# Patient Record
Sex: MALE | Race: OTHER | HISPANIC OR LATINO
[De-identification: names, ages, dates, MRNs, and addresses within clinical notes are randomized per-mention and may not be internally consistent; named-entity substitution may affect disease eponyms.]

---

## 2020-07-21 PROBLEM — Z00.00 ENCOUNTER FOR PREVENTIVE HEALTH EXAMINATION: Status: ACTIVE | Noted: 2020-07-21

## 2020-07-23 ENCOUNTER — NON-APPOINTMENT (OUTPATIENT)
Age: 60
End: 2020-07-23

## 2020-07-23 ENCOUNTER — APPOINTMENT (OUTPATIENT)
Dept: HEART AND VASCULAR | Facility: CLINIC | Age: 60
End: 2020-07-23
Payer: COMMERCIAL

## 2020-07-23 VITALS
OXYGEN SATURATION: 98 % | HEIGHT: 68 IN | HEART RATE: 67 BPM | DIASTOLIC BLOOD PRESSURE: 74 MMHG | WEIGHT: 182 LBS | SYSTOLIC BLOOD PRESSURE: 130 MMHG | BODY MASS INDEX: 27.58 KG/M2

## 2020-07-23 VITALS — TEMPERATURE: 98.2 F

## 2020-07-23 DIAGNOSIS — I10 ESSENTIAL (PRIMARY) HYPERTENSION: ICD-10-CM

## 2020-07-23 PROCEDURE — 93306 TTE W/DOPPLER COMPLETE: CPT

## 2020-07-23 PROCEDURE — 93000 ELECTROCARDIOGRAM COMPLETE: CPT

## 2020-07-23 PROCEDURE — 36415 COLL VENOUS BLD VENIPUNCTURE: CPT

## 2020-07-23 PROCEDURE — 81005 URINALYSIS: CPT

## 2020-07-23 PROCEDURE — 99205 OFFICE O/P NEW HI 60 MIN: CPT | Mod: 25

## 2020-07-23 RX ORDER — ASPIRIN 81 MG/1
81 TABLET ORAL DAILY
Qty: 30 | Refills: 6 | Status: ACTIVE | COMMUNITY
Start: 2020-07-23 | End: 1900-01-01

## 2020-07-23 NOTE — PHYSICAL EXAM
[General Appearance - Well Developed] : well developed [General Appearance - In No Acute Distress] : no acute distress [General Appearance - Well Nourished] : well nourished [Normal Conjunctiva] : the conjunctiva exhibited no abnormalities [Normal Jugular Venous V Waves Present] : normal jugular venous V waves present [Normal Oropharynx] : normal oropharynx [Heart Rate And Rhythm] : heart rate and rhythm were normal [Heart Sounds] : normal S1 and S2 [Murmurs] : no murmurs present [Edema] : no peripheral edema present [Arterial Pulses Normal] : the arterial pulses were normal [Respiration, Rhythm And Depth] : normal respiratory rhythm and effort [Auscultation Breath Sounds / Voice Sounds] : lungs were clear to auscultation bilaterally [Bowel Sounds] : normal bowel sounds [Abdomen Tenderness] : non-tender [Abdomen Soft] : soft [Abnormal Walk] : normal gait [Nail Clubbing] : no clubbing of the fingernails [Cyanosis, Localized] : no localized cyanosis [] : no rash [Skin Lesions] : no skin lesions [No Anxiety] : not feeling anxious [Oriented To Time, Place, And Person] : oriented to person, place, and time

## 2020-07-27 ENCOUNTER — APPOINTMENT (OUTPATIENT)
Dept: HEART AND VASCULAR | Facility: CLINIC | Age: 60
End: 2020-07-27
Payer: COMMERCIAL

## 2020-07-27 VITALS — TEMPERATURE: 98.7 F

## 2020-07-27 LAB
ALBUMIN SERPL ELPH-MCNC: 4.8 G/DL
ALP BLD-CCNC: 81 U/L
ALT SERPL-CCNC: 23 U/L
ANION GAP SERPL CALC-SCNC: 14 MMOL/L
APPEARANCE: CLEAR
AST SERPL-CCNC: 19 U/L
BACTERIA: NEGATIVE
BASOPHILS # BLD AUTO: 0.05 K/UL
BASOPHILS NFR BLD AUTO: 0.4 %
BILIRUB SERPL-MCNC: 0.7 MG/DL
BILIRUBIN URINE: NEGATIVE
BLOOD URINE: NEGATIVE
BUN SERPL-MCNC: 18 MG/DL
CALCIUM SERPL-MCNC: 9.8 MG/DL
CHLORIDE SERPL-SCNC: 102 MMOL/L
CHOLEST SERPL-MCNC: 194 MG/DL
CHOLEST/HDLC SERPL: 4.1 RATIO
CO2 SERPL-SCNC: 25 MMOL/L
COLOR: YELLOW
CREAT SERPL-MCNC: 0.79 MG/DL
EOSINOPHIL # BLD AUTO: 0.3 K/UL
EOSINOPHIL NFR BLD AUTO: 2.7 %
ESTIMATED AVERAGE GLUCOSE: 108 MG/DL
GLUCOSE QUALITATIVE U: NEGATIVE
GLUCOSE SERPL-MCNC: 81 MG/DL
HBA1C MFR BLD HPLC: 5.4 %
HCT VFR BLD CALC: 49.2 %
HDLC SERPL-MCNC: 47 MG/DL
HGB BLD-MCNC: 15.6 G/DL
HYALINE CASTS: 2 /LPF
IMM GRANULOCYTES NFR BLD AUTO: 0.6 %
KETONES URINE: NORMAL
LDLC SERPL CALC-MCNC: 116 MG/DL
LEUKOCYTE ESTERASE URINE: NEGATIVE
LYMPHOCYTES # BLD AUTO: 1.91 K/UL
LYMPHOCYTES NFR BLD AUTO: 16.9 %
MAGNESIUM SERPL-MCNC: 2.2 MG/DL
MAN DIFF?: NORMAL
MCHC RBC-ENTMCNC: 30.8 PG
MCHC RBC-ENTMCNC: 31.7 GM/DL
MCV RBC AUTO: 97 FL
MICROSCOPIC-UA: NORMAL
MONOCYTES # BLD AUTO: 0.96 K/UL
MONOCYTES NFR BLD AUTO: 8.5 %
NEUTROPHILS # BLD AUTO: 7.99 K/UL
NEUTROPHILS NFR BLD AUTO: 70.9 %
NITRITE URINE: NEGATIVE
PH URINE: 5.5
PLATELET # BLD AUTO: 212 K/UL
POTASSIUM SERPL-SCNC: 4.8 MMOL/L
PROT SERPL-MCNC: 7.4 G/DL
PROTEIN URINE: NORMAL
RBC # BLD: 5.07 M/UL
RBC # FLD: 13.6 %
RED BLOOD CELLS URINE: 1 /HPF
SARS-COV-2 IGG SERPL IA-ACNC: 0.08 INDEX
SARS-COV-2 IGG SERPL QL IA: NEGATIVE
SODIUM SERPL-SCNC: 141 MMOL/L
SPECIFIC GRAVITY URINE: 1.04
SQUAMOUS EPITHELIAL CELLS: 0 /HPF
TRIGL SERPL-MCNC: 152 MG/DL
TSH SERPL-ACNC: 0.97 UIU/ML
UROBILINOGEN URINE: ABNORMAL
WBC # FLD AUTO: 11.28 K/UL
WHITE BLOOD CELLS URINE: 1 /HPF

## 2020-07-27 PROCEDURE — 93306 TTE W/DOPPLER COMPLETE: CPT

## 2020-08-03 NOTE — DISCUSSION/SUMMARY
[Patient] : the patient [___ Week(s)] : [unfilled] week(s) [FreeTextEntry1] : \par 58 y/o male with h/o hl, smoker, overweight who presents for initial evaluation of chest pain\par \par -ordered ekg today - nsr, normal intervals, no st/t changes\par -ordered labs today\par -ordered CTA vs Nuc and Echo\par -pulm referral given smoking history and sob\par -smoking cessation referral and discussion\par -start asa 81 mg qd\par -consider statin pending labs\par -counseled on cvd risk factors\par -f/up 3 weeks

## 2020-08-03 NOTE — HISTORY OF PRESENT ILLNESS
[FreeTextEntry1] : \par 60 y/o male with h/o HL, smoker who presents for initial evaluation of chest pain and abnormal ekg\par \par \par \par Notes 2 weeks of cp, nonradiating, 7/10, lasts seconds-minutes, pressure, no associated symptoms\par \par no cough, fever\par Notes some sob over past 2 years with exertion\par no orthopnea, pnd \par no edema\par no syncope, lh, palpitations\par \par noted to have snoring by wife\par actively smoking, h/o 30 year use\par Not actively exercising\par walks a lot with work\par \par PMH/PSH:\par HL\par leg surgery right \par overweight\par \par \par \par ALL:\par nkda\par \par \par \par MEDS:\par advil prn\par \par \par \par SH:\par 10 cigs/day now - prior 1ppd x 30 years\par no etoh/drugs\[place] \par works construction\par live with fiance\par no children\par \par \par \par FH:\par mother - dm,  84\par father - cva,  89\par 9 sisters - alive, healthy\par

## 2020-08-14 ENCOUNTER — RESULT REVIEW (OUTPATIENT)
Age: 60
End: 2020-08-14

## 2020-08-14 ENCOUNTER — OUTPATIENT (OUTPATIENT)
Dept: OUTPATIENT SERVICES | Facility: HOSPITAL | Age: 60
LOS: 1 days | End: 2020-08-14
Payer: COMMERCIAL

## 2020-08-14 ENCOUNTER — APPOINTMENT (OUTPATIENT)
Dept: CT IMAGING | Facility: HOSPITAL | Age: 60
End: 2020-08-14
Payer: COMMERCIAL

## 2020-08-14 PROCEDURE — 71250 CT THORAX DX C-: CPT | Mod: 26,59

## 2020-08-14 PROCEDURE — 75574 CT ANGIO HRT W/3D IMAGE: CPT | Mod: 26

## 2020-08-14 PROCEDURE — 71250 CT THORAX DX C-: CPT

## 2020-08-14 PROCEDURE — 75574 CT ANGIO HRT W/3D IMAGE: CPT

## 2020-08-17 DIAGNOSIS — I25.10 ATHEROSCLEROTIC HEART DISEASE OF NATIVE CORONARY ARTERY W/OUT ANGINA PECTORIS: ICD-10-CM

## 2020-08-24 DIAGNOSIS — Z11.59 ENCOUNTER FOR SCREENING FOR OTHER VIRAL DISEASES: ICD-10-CM

## 2020-08-25 ENCOUNTER — APPOINTMENT (OUTPATIENT)
Dept: PULMONOLOGY | Facility: CLINIC | Age: 60
End: 2020-08-25

## 2020-09-01 ENCOUNTER — APPOINTMENT (OUTPATIENT)
Dept: PULMONOLOGY | Facility: CLINIC | Age: 60
End: 2020-09-01
Payer: COMMERCIAL

## 2020-09-01 VITALS
DIASTOLIC BLOOD PRESSURE: 82 MMHG | WEIGHT: 182 LBS | TEMPERATURE: 97.8 F | OXYGEN SATURATION: 97 % | HEART RATE: 67 BPM | SYSTOLIC BLOOD PRESSURE: 114 MMHG | HEIGHT: 68 IN | BODY MASS INDEX: 27.58 KG/M2

## 2020-09-01 DIAGNOSIS — F17.200 NICOTINE DEPENDENCE, UNSPECIFIED, UNCOMPLICATED: ICD-10-CM

## 2020-09-01 DIAGNOSIS — Z80.0 FAMILY HISTORY OF MALIGNANT NEOPLASM OF DIGESTIVE ORGANS: ICD-10-CM

## 2020-09-01 DIAGNOSIS — R07.9 CHEST PAIN, UNSPECIFIED: ICD-10-CM

## 2020-09-01 DIAGNOSIS — Z86.39 PERSONAL HISTORY OF OTHER ENDOCRINE, NUTRITIONAL AND METABOLIC DISEASE: ICD-10-CM

## 2020-09-01 DIAGNOSIS — Z78.9 OTHER SPECIFIED HEALTH STATUS: ICD-10-CM

## 2020-09-01 PROCEDURE — 36415 COLL VENOUS BLD VENIPUNCTURE: CPT

## 2020-09-01 PROCEDURE — 99204 OFFICE O/P NEW MOD 45 MIN: CPT | Mod: 25

## 2020-09-01 RX ORDER — FLUTICASONE FUROATE AND VILANTEROL TRIFENATATE 100; 25 UG/1; UG/1
100-25 POWDER RESPIRATORY (INHALATION)
Refills: 0 | Status: ACTIVE | COMMUNITY

## 2020-09-02 LAB
ALBUMIN SERPL ELPH-MCNC: 4.8 G/DL
ALP BLD-CCNC: 76 U/L
ALT SERPL-CCNC: 29 U/L
ANION GAP SERPL CALC-SCNC: 13 MMOL/L
AST SERPL-CCNC: 21 U/L
BASOPHILS # BLD AUTO: 0.06 K/UL
BASOPHILS NFR BLD AUTO: 0.5 %
BILIRUB SERPL-MCNC: 0.9 MG/DL
BUN SERPL-MCNC: 17 MG/DL
CALCIUM SERPL-MCNC: 9.7 MG/DL
CHLORIDE SERPL-SCNC: 101 MMOL/L
CO2 SERPL-SCNC: 24 MMOL/L
CREAT SERPL-MCNC: 0.75 MG/DL
CRP SERPL-MCNC: 0.16 MG/DL
EOSINOPHIL # BLD AUTO: 0.38 K/UL
EOSINOPHIL NFR BLD AUTO: 2.9 %
GLUCOSE SERPL-MCNC: 80 MG/DL
HCT VFR BLD CALC: 45.8 %
HGB BLD-MCNC: 15.3 G/DL
IMM GRANULOCYTES NFR BLD AUTO: 0.5 %
LYMPHOCYTES # BLD AUTO: 2.43 K/UL
LYMPHOCYTES NFR BLD AUTO: 18.5 %
MAN DIFF?: NORMAL
MCHC RBC-ENTMCNC: 31.2 PG
MCHC RBC-ENTMCNC: 33.4 GM/DL
MCV RBC AUTO: 93.5 FL
MONOCYTES # BLD AUTO: 1.02 K/UL
MONOCYTES NFR BLD AUTO: 7.8 %
NEUTROPHILS # BLD AUTO: 9.21 K/UL
NEUTROPHILS NFR BLD AUTO: 69.8 %
PLATELET # BLD AUTO: 200 K/UL
POTASSIUM SERPL-SCNC: 4.4 MMOL/L
PROT SERPL-MCNC: 7 G/DL
RBC # BLD: 4.9 M/UL
RBC # FLD: 12.9 %
SODIUM SERPL-SCNC: 138 MMOL/L
WBC # FLD AUTO: 13.16 K/UL

## 2020-09-03 LAB — ACE BLD-CCNC: 31 U/L

## 2020-09-03 NOTE — REASON FOR VISIT
[Initial] : an initial visit [Shortness of Breath] : shortness of breath [Pulmonary Nodules] : pulmonary nodules [TextBox_44] : sarcoidosis, dust exposure

## 2020-09-03 NOTE — HISTORY OF PRESENT ILLNESS
[Current] : current [TextBox_4] : 59 year old male current smoker 30 pack year history with PMHx of HLD & CAD presents today for further evaluation of shortness of breath & lung nodules.\par \par Approximately 3 years ago, patient found to have enlarged lymph nodes & emphysema . Biopsy was done which he states was negative for malignancy. Stopped smoking for about 6 months using NRT. \par Has been having a few months of nonradiating chest pain, mostly on the left side, 8/10, lasts for a few seconds- no aggravating or relieving factors- no associated symptoms.  Following with Dr. Rajan- started patient on statin for hyperlipidemia. Had echo recently which did not show significant cardiac abnormalities. \par Patient states his breathing is stable. Uses Breo Ellipta 1-2x a week when coughing and having chest tightness,  prescribed by outside pulmonologist. Intermittent dry cough, comes and goes. Wheezes usually at bedtime. 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 Working at least 8 hours per day. Has been doing this for many years. \par Has a dog at home; no other pets. \par No mold or leaks in home. \par No personal or family history of clots or lung disease. \par \par \par PFTs & 6MWT scheduled for 2 weeks [TextBox_13] : 30 [TextBox_11] : 1

## 2020-09-03 NOTE — DISCUSSION/SUMMARY
[FreeTextEntry1] : Attending's physical exam 9/1/20\par - Mild pallor, no icterus\par - Minimal arthritic changes visible; no visible rash; no cyanosis or clubbing\par - Calluses seen on palmar surfaces of both hands, especially right (over pressure points); no palmar erythema \par - No JVD at 45, no hepatojugular reflux, no hepatosplenomegaly\par - No pedal edema\par - Good air entry bilaterally; no wheezing, rhonchi or crackles\par - Normal S1, S2 no murmurs rubs or gallops, p2 not loud or split

## 2020-09-03 NOTE — PROCEDURE
[FreeTextEntry1] : CT chest 8/14/20 \par - Several scattered solid nodules measuring up to 5 mm, likely inflammatory. \par - Cyst in lateral basal left lower lobe\par - Airtrapping in both lungs \par - Minimal emphysema. Small and large airway disease. \par - Borderline dilated ascending aorta, 3.8 cm. \par \par Echo 10/27\par - PASP 24mmHg, LVEF 60%, Stage 1 diastolic dysfunction \par

## 2020-09-03 NOTE — PHYSICAL EXAM
[No Acute Distress] : no acute distress [Normal Oropharynx] : normal oropharynx [Normal Appearance] : normal appearance [No Neck Mass] : no neck mass [Normal Rate/Rhythm] : normal rate/rhythm [Normal S1, S2] : normal s1, s2 [No Murmurs] : no murmurs [No Resp Distress] : no resp distress [Clear to Auscultation Bilaterally] : clear to auscultation bilaterally [No Abnormalities] : no abnormalities [Benign] : benign [Normal Gait] : normal gait [No Clubbing] : no clubbing [No Cyanosis] : no cyanosis [No Edema] : no edema [FROM] : FROM [Normal Color/ Pigmentation] : normal color/ pigmentation [No Focal Deficits] : no focal deficits [Oriented x3] : oriented x3 [Normal Affect] : normal affect [IV] : Mallampati Class: IV [Neck Circumference: ___] : neck circumference: [unfilled] [TextBox_54] : Normal S1, S2 no murmurs rubs or gallops, P2 not loud or split  [TextBox_11] : Mild pallor, no icterus [TextBox_68] :  Good air entry bilaterally; no wheezing, rhonchi or crackles [TextBox_105] : Calluses seen on palmar surfaces of both hands, especially right (over pressure points); no palmar erythema

## 2020-09-03 NOTE — ASSESSMENT
[FreeTextEntry1] : 9-1-20\par It was a pleasure to meet Andrzej in consultation today. His respiratory issues are summarized below:\par \par (1) Abnormal CT scan\par I have reviewed his CT scan from 8/14/20 which shows bilateral hilar and mediastinal lymphadenopathy which is symmetric. There is evidence of thickening and dilatation of the bronchi suggestive of bronchiectasis. Airway lumen is irregular. There is generalized mosaic attenuation. There is a 1 cm cyst in the left lower lobe lateral basal segment. There is minimal centrilobular emphysema. A few scattered pulmonary nodules which measure up to 5 mm in size are seen. This is suggestive of sarcoidosis. It is difficult to tell whether the sarcoidosis is active at the present time. Almost 70% of cases of sarcoidosis becoming inactive within the first two years of diagnosis. Today, we will get ACE levels, CRP, liver function tests to check for disease activity. We have also requested patient and his wife to get bronchoscopy biopsy specimen from [facility] in [place].. We would like to get a second opinion on pathology. In the meantime, we will get full PFTs done, including FVC, DLCO and 6MWT. We will continue to follow the patient's symptoms, PFTs and CT chests periodically. \par \par (2) Mineral dust pneumoconiosis\par Patient is a  exposed to significant amount of dust and chemicals. In the last 6 months he has not had the appropriate protective mask and feels that his contact with chemicals and dust is worsening his respiratory condition. With a history such as Andrzej's, I am concerned for two additional things.\par A. Mineral dust pneumoconiosis\par B. Non-smoking related chronic obstructive pulmonary disease\par These conditions may compound the existing lung condition from sarcoidosis. We have therefore advised Andrzej to stop working in this kind of environment. It is imperative that he not work, especially without the appropriate protective equipment. Will provide him with a note.\par \par (3) Smoking Cessation\par He has been very strongly advised to stop smoking. He has 20-30 pack year smoking history and is an active smoker. Will refer for smoking cessation education.\par \par (4) Lung Cancer Screening\par Patient has approximate 30 pack year smoking history, between age of 55-77 and is active smoker. We will enroll in Lung Cancer Screening program. \par \par (5) At risk for sleep apnea\par Patient snores at night. Collar size >16.5. Will obtain home sleep study. \par \par (6) Health maintenance\par Stressed importance of daily exercise, including brisk walking at least 5-6 days per week. Patient will need flu vaccine for [id number] flu season & can get in Mid-September.\par \par Return to clinic\par - 1 month for telehealth \par \par

## 2020-09-03 NOTE — REVIEW OF SYSTEMS
[Negative] : Endocrine [Cough] : cough [Chest Tightness] : chest tightness [Wheezing] : wheezing [Chest Discomfort] : chest discomfort [Fever] : no fever [Fatigue] : no fatigue [Sputum] : no sputum [Chills] : no chills [Pleuritic Pain] : no pleuritic pain [Dyspnea] : no dyspnea [SOB on Exertion] : no sob on exertion

## 2020-09-14 ENCOUNTER — OUTPATIENT (OUTPATIENT)
Dept: OUTPATIENT SERVICES | Facility: HOSPITAL | Age: 60
LOS: 1 days | End: 2020-09-14
Payer: COMMERCIAL

## 2020-09-14 DIAGNOSIS — R06.02 SHORTNESS OF BREATH: ICD-10-CM

## 2020-09-14 PROCEDURE — 94726 PLETHYSMOGRAPHY LUNG VOLUMES: CPT | Mod: 26

## 2020-09-14 PROCEDURE — 94729 DIFFUSING CAPACITY: CPT

## 2020-09-14 PROCEDURE — 94618 PULMONARY STRESS TESTING: CPT | Mod: 26

## 2020-09-14 PROCEDURE — 94726 PLETHYSMOGRAPHY LUNG VOLUMES: CPT

## 2020-09-14 PROCEDURE — 94729 DIFFUSING CAPACITY: CPT | Mod: 26

## 2020-09-14 PROCEDURE — 94010 BREATHING CAPACITY TEST: CPT | Mod: 26

## 2020-09-14 PROCEDURE — 94760 N-INVAS EAR/PLS OXIMETRY 1: CPT

## 2020-09-14 PROCEDURE — 94618 PULMONARY STRESS TESTING: CPT

## 2020-09-14 PROCEDURE — 94060 EVALUATION OF WHEEZING: CPT

## 2020-10-13 ENCOUNTER — APPOINTMENT (OUTPATIENT)
Dept: PULMONOLOGY | Facility: CLINIC | Age: 60
End: 2020-10-13

## 2020-10-28 ENCOUNTER — NON-APPOINTMENT (OUTPATIENT)
Age: 60
End: 2020-10-28

## 2021-07-06 ENCOUNTER — RX RENEWAL (OUTPATIENT)
Age: 61
End: 2021-07-06

## 2021-09-09 PROBLEM — Z91.89 AT RISK FOR OBSTRUCTIVE SLEEP APNEA: Status: ACTIVE | Noted: 2020-09-01

## 2021-09-09 PROBLEM — J63.6: Status: ACTIVE | Noted: 2020-09-01

## 2021-09-14 ENCOUNTER — APPOINTMENT (OUTPATIENT)
Dept: PULMONOLOGY | Facility: CLINIC | Age: 61
End: 2021-09-14
Payer: COMMERCIAL

## 2021-09-14 VITALS
WEIGHT: 195 LBS | DIASTOLIC BLOOD PRESSURE: 86 MMHG | HEIGHT: 68 IN | HEART RATE: 66 BPM | SYSTOLIC BLOOD PRESSURE: 174 MMHG | TEMPERATURE: 97.4 F | BODY MASS INDEX: 29.55 KG/M2 | OXYGEN SATURATION: 96 %

## 2021-09-14 DIAGNOSIS — Z91.89 OTHER SPECIFIED PERSONAL RISK FACTORS, NOT ELSEWHERE CLASSIFIED: ICD-10-CM

## 2021-09-14 DIAGNOSIS — J63.6: ICD-10-CM

## 2021-09-14 PROCEDURE — 99215 OFFICE O/P EST HI 40 MIN: CPT

## 2021-09-14 NOTE — PHYSICAL EXAM
[No Acute Distress] : no acute distress [Normal Oropharynx] : normal oropharynx [IV] : Mallampati Class: IV [Neck Circumference: ___] : neck circumference: [unfilled] [No Neck Mass] : no neck mass [Normal Rate/Rhythm] : normal rate/rhythm [Normal S1, S2] : normal s1, s2 [No Murmurs] : no murmurs [No Resp Distress] : no resp distress [Clear to Auscultation Bilaterally] : clear to auscultation bilaterally [Benign] : benign [No Abnormalities] : no abnormalities [Normal Gait] : normal gait [No Clubbing] : no clubbing [No Cyanosis] : no cyanosis [No Edema] : no edema [FROM] : FROM [Normal Color/ Pigmentation] : normal color/ pigmentation [No Focal Deficits] : no focal deficits [Oriented x3] : oriented x3 [Normal Affect] : normal affect [TextBox_11] : no pallor, no icterus [TextBox_44] :  erythema noted at the base of the neck [TextBox_54] : Normal S1, S2 no murmurs rubs or gallops, P2 not loud or split  [TextBox_68] :  Good air entry bilaterally; no wheezing, rhonchi or crackles [TextBox_105] : bony prominence noted on the middle finger of the right hand, no thickening of the skin, radial pulses equal and palpable

## 2021-09-14 NOTE — REASON FOR VISIT
[Shortness of Breath] : shortness of breath [Pulmonary Nodules] : pulmonary nodules [Follow-Up] : a follow-up visit [TextBox_44] : sarcoidosis, dust exposure

## 2021-09-14 NOTE — REVIEW OF SYSTEMS
[Cough] : cough [Chest Tightness] : chest tightness [Wheezing] : wheezing [Chest Discomfort] : chest discomfort [Negative] : Endocrine [Dyspnea] : dyspnea [SOB on Exertion] : sob on exertion [Fever] : no fever [Fatigue] : no fatigue [Chills] : no chills [Sputum] : no sputum [Pleuritic Pain] : no pleuritic pain

## 2021-09-14 NOTE — HISTORY OF PRESENT ILLNESS
[Former] : former [TextBox_4] : 59 year old male current smoker 30 pack year history with PMHx of HLD & CAD presents today for further evaluation of shortness of breath & lung nodules.\par \par Approximately 3 years ago, patient found to have enlarged lymph nodes & emphysema . Biopsy was done which he states was negative for malignancy. Stopped smoking for about 6 months using NRT. \par Has been having a few months of nonradiating chest pain, mostly on the left side, 8/10, lasts for a few seconds- no aggravating or relieving factors- no associated symptoms.  Following with Dr. Rajan- started patient on statin for hyperlipidemia. Had echo recently which did not show significant cardiac abnormalities. \par Patient states his breathing is stable. Uses Breo Ellipta 1-2x a week when coughing and having chest tightness,  prescribed by outside pulmonologist. Intermittent dry cough, comes and goes. Wheezes usually at bedtime. 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 Working at least 8 hours per day. Has been doing this for many years. \par Has a dog at home; no other pets. \par No mold or leaks in home. \par No personal or family history of clots or lung disease. \par \par \par 9-14-21:\par He has been getting tired. \par He is having chest pain on the left\par He has shortness of breath after walking up 1 flight of stairs\par He has quit smoking, stopped Sept 2020\par He continues on Breo\par Still working in construction [TextBox_13] : 30 [TextBox_11] : 1 [YearQuit] : 2020

## 2021-09-14 NOTE — DISCUSSION/SUMMARY
[FreeTextEntry1] : Attending's physical exam \par 9-14-21:\par - no pallor, no icterus\par - erythema noted at the base of the neck\par - Minimal arthritic changes visible; no visible rash; no cyanosis or clubbing\par - bony prominence noted on the middle finger of the right hand, no thickening of the skin, radial pulses equal and palpable\par - No JVD at 45, no hepatojugular reflux, no hepatosplenomegaly\par - No pedal edema\par - Good air entry bilaterally; no wheezing, rhonchi or crackles\par - Normal S1, S2 no murmurs rubs or gallops, p2 not loud or split

## 2021-09-14 NOTE — ASSESSMENT
[FreeTextEntry1] : 9-14-21:\par It was a pleasure to see Andrzej in follow up today. His respiratory issues are summarized below:\par \par (1) Abnormal CT scan\par I have reviewed his CT scan from 8/14/20 which shows bilateral hilar and mediastinal lymphadenopathy which is symmetric. There is evidence of thickening and dilatation of the bronchi suggestive of bronchiectasis. There is generalized mosaic attenuation. There is a 1 cm cyst in the left lower lobe lateral basal segment. There is minimal centrilobular emphysema. There are hazy ill defined ground glass nodules. Most the CT lung findings are consistent with smoking related lung disease, respiratory bronchiolitis. These findings may also be suggestive of sarcoidosis. Almost 70% of cases of sarcoidosis becoming inactive within the first two years of diagnosis. ACE level, CRP, and liver function tests to check for disease activity were all normal. We have not been able to obtain his biopsy specimen from [facility] to get a second opinion.\par \par PFT done 9/14/20 shows normal spirometry, normal lung volumes and mildly reduced diffusion capacity (DLCO: 21.1 (75%)\par 6MWT 9/14/20: Distance: 512 meters, SpO2 start: 100% on RA -> SpO2 end: 100% on RA\par \par Plan:\par - We will repeat PFT and 6MWT now\par - We will get a low dose Chest CT to re-evaluate with prone positioning\par - We have again requested patient and his wife to get bronchoscopy biopsy specimen from [facility] in [place]. We would like to get a second opinion on pathology. \par \par (2) Mineral dust pneumoconiosis\par Patient is a  exposed to significant amount of dust and chemicals. He has not had the appropriate protective mask and feels that his contact with chemicals and dust is worsening his respiratory condition. With a history such as Andrzej's, I am concerned for two additional things.\par A. Mineral dust pneumoconiosis\par B. Non-smoking related chronic obstructive pulmonary disease\par These conditions may compound the existing lung condition from sarcoidosis. We have therefore advised Andrzej to stop working in this kind of environment. It is imperative that he not work, especially without the appropriate protective equipment. \par \par (3) Smoking Cessation\par He has stopped smoking. He has a 20-30 pack year smoking history. CT shows evidence of emphysema. Continue Breo Ellipta.\par \par (4) Lung Cancer Screening\par Patient has approximate 30 pack year smoking history, between age of 55-77 and quit smoking in 2020. He is due for annual chest CT. . We will hold off referring him to lung cancer screening program until we get a follow up chest CT with prone positioning (see above).\par \par (5) At risk for sleep apnea\par Patient snores at night. Collar size >16.5. We ordered a home sleep study, but this hasn't been done. We have again recommended this today.\par \par (6) Health maintenance\par Stressed importance of daily exercise, including brisk walking at least 5-6 days per week. \par \par Return to clinic: 6 months

## 2021-09-14 NOTE — PROCEDURE
[FreeTextEntry1] : Spirometry, DLCO 20\par FVC: 4.16 L (96%)--> 3.99 L (92%) \par FEV1: 2.9 L (86%)--> 2.8 L (83%) \par FEV1/FVC: 90%--> 90%\par AFX54-82%: 1.84 L/s (59%)--> 1.79 L/s (57%)\par T.98 L (98%)\par RV/T%\par DLCO: 21.1 (75%)\par \par 6MWT 20\par Distance: 512 meters\par SpO2 start: 100% on RA\par SpO2 end: 100% on RA\par \par CT chest 20 \par - Several scattered solid nodules measuring up to 5 mm, likely inflammatory. \par - Cyst in lateral basal left lower lobe\par - Airtrapping in both lungs \par - Minimal emphysema. Small and large airway disease. \par - Borderline dilated ascending aorta, 3.8 cm. \par \par Echo 10/27\par - PASP 24mmHg, LVEF 60%, Stage 1 diastolic dysfunction \par

## 2021-09-27 ENCOUNTER — APPOINTMENT (OUTPATIENT)
Dept: CT IMAGING | Facility: CLINIC | Age: 61
End: 2021-09-27
Payer: COMMERCIAL

## 2021-09-27 ENCOUNTER — OUTPATIENT (OUTPATIENT)
Dept: OUTPATIENT SERVICES | Facility: HOSPITAL | Age: 61
LOS: 1 days | End: 2021-09-27

## 2021-09-27 ENCOUNTER — APPOINTMENT (OUTPATIENT)
Dept: RADIOLOGY | Facility: CLINIC | Age: 61
End: 2021-09-27
Payer: COMMERCIAL

## 2021-09-27 ENCOUNTER — RESULT REVIEW (OUTPATIENT)
Age: 61
End: 2021-09-27

## 2021-09-27 PROCEDURE — 73600 X-RAY EXAM OF ANKLE: CPT | Mod: 26,RT

## 2021-09-27 PROCEDURE — 71250 CT THORAX DX C-: CPT | Mod: 26

## 2021-09-27 PROCEDURE — 73630 X-RAY EXAM OF FOOT: CPT | Mod: 26,RT

## 2021-10-01 ENCOUNTER — NON-APPOINTMENT (OUTPATIENT)
Age: 61
End: 2021-10-01

## 2021-10-06 ENCOUNTER — APPOINTMENT (OUTPATIENT)
Dept: GASTROENTEROLOGY | Facility: CLINIC | Age: 61
End: 2021-10-06
Payer: COMMERCIAL

## 2021-10-06 DIAGNOSIS — K62.5 HEMORRHAGE OF ANUS AND RECTUM: ICD-10-CM

## 2021-10-06 PROCEDURE — 99214 OFFICE O/P EST MOD 30 MIN: CPT

## 2021-10-06 RX ORDER — SODIUM SULFATE, MAGNESIUM SULFATE, AND POTASSIUM CHLORIDE 17.75; 2.7; 2.25 G/1; G/1; G/1
1479-225-188 TABLET ORAL
Qty: 1 | Refills: 0 | Status: ACTIVE | COMMUNITY
Start: 2021-10-06 | End: 1900-01-01

## 2021-10-06 NOTE — PHYSICAL EXAM
[General Appearance - Alert] : alert [General Appearance - In No Acute Distress] : in no acute distress [Sclera] : the sclera and conjunctiva were normal [PERRL With Normal Accommodation] : pupils were equal in size, round, and reactive to light [Extraocular Movements] : extraocular movements were intact [Outer Ear] : the ears and nose were normal in appearance [Oropharynx] : the oropharynx was normal [Full Pulse] : the pedal pulses are present [Edema] : there was no peripheral edema [Bowel Sounds] : normal bowel sounds [Abdomen Soft] : soft [Abdomen Tenderness] : non-tender [Abdomen Mass (___ Cm)] : no abdominal mass palpated [Cervical Lymph Nodes Enlarged Posterior Bilaterally] : posterior cervical [Cervical Lymph Nodes Enlarged Anterior Bilaterally] : anterior cervical [Supraclavicular Lymph Nodes Enlarged Bilaterally] : supraclavicular [Axillary Lymph Nodes Enlarged Bilaterally] : axillary [Femoral Lymph Nodes Enlarged Bilaterally] : femoral [Inguinal Lymph Nodes Enlarged Bilaterally] : inguinal [No CVA Tenderness] : no ~M costovertebral angle tenderness [No Spinal Tenderness] : no spinal tenderness [Skin Color & Pigmentation] : normal skin color and pigmentation [Skin Turgor] : normal skin turgor [] : no rash [Deep Tendon Reflexes (DTR)] : deep tendon reflexes were 2+ and symmetric [Sensation] : the sensory exam was normal to light touch and pinprick [No Focal Deficits] : no focal deficits [Oriented To Time, Place, And Person] : oriented to person, place, and time [Impaired Insight] : insight and judgment were intact [Affect] : the affect was normal

## 2021-10-06 NOTE — ASSESSMENT
[FreeTextEntry1] : A low acid / reflux diet was discussed in great detail including  not smoking, not drinking alcohol, and not consuming foods that irritate the esophagus. It is helpful to eat small meals throughout the day instead of large meals. You should avoid eating before bedtime or lying down after you eat. It can be helpful to raise the head of your bed six inches. Additionally, you should maintain a healthy weight and good posture.. The patient was given written material to take home and review.\par \par pulm clearance

## 2021-10-07 ENCOUNTER — NON-APPOINTMENT (OUTPATIENT)
Age: 61
End: 2021-10-07

## 2021-10-13 ENCOUNTER — APPOINTMENT (OUTPATIENT)
Dept: PULMONOLOGY | Facility: CLINIC | Age: 61
End: 2021-10-13

## 2021-11-08 ENCOUNTER — APPOINTMENT (OUTPATIENT)
Dept: HEART AND VASCULAR | Facility: CLINIC | Age: 61
End: 2021-11-08
Payer: COMMERCIAL

## 2021-11-08 ENCOUNTER — NON-APPOINTMENT (OUTPATIENT)
Age: 61
End: 2021-11-08

## 2021-11-08 VITALS
BODY MASS INDEX: 28.64 KG/M2 | HEART RATE: 63 BPM | WEIGHT: 189 LBS | HEIGHT: 68 IN | DIASTOLIC BLOOD PRESSURE: 84 MMHG | OXYGEN SATURATION: 95 % | TEMPERATURE: 98.1 F | SYSTOLIC BLOOD PRESSURE: 125 MMHG

## 2021-11-08 DIAGNOSIS — Z01.818 ENCOUNTER FOR OTHER PREPROCEDURAL EXAMINATION: ICD-10-CM

## 2021-11-08 PROCEDURE — 36415 COLL VENOUS BLD VENIPUNCTURE: CPT

## 2021-11-08 PROCEDURE — 93000 ELECTROCARDIOGRAM COMPLETE: CPT

## 2021-11-08 PROCEDURE — 99215 OFFICE O/P EST HI 40 MIN: CPT | Mod: 25

## 2021-11-09 LAB
ALBUMIN SERPL ELPH-MCNC: 4.9 G/DL
ALP BLD-CCNC: 74 U/L
ALT SERPL-CCNC: 31 U/L
ANION GAP SERPL CALC-SCNC: 13 MMOL/L
APPEARANCE: CLEAR
APTT BLD: 30.8 SEC
AST SERPL-CCNC: 25 U/L
BACTERIA: NEGATIVE
BASOPHILS # BLD AUTO: 0.05 K/UL
BASOPHILS NFR BLD AUTO: 0.6 %
BILIRUB SERPL-MCNC: 1.3 MG/DL
BILIRUBIN URINE: NEGATIVE
BLOOD URINE: NEGATIVE
BUN SERPL-MCNC: 24 MG/DL
CALCIUM SERPL-MCNC: 9.7 MG/DL
CHLORIDE SERPL-SCNC: 104 MMOL/L
CHOLEST SERPL-MCNC: 138 MG/DL
CO2 SERPL-SCNC: 25 MMOL/L
COLOR: NORMAL
CREAT SERPL-MCNC: 0.84 MG/DL
CREAT SPEC-SCNC: 123 MG/DL
EOSINOPHIL # BLD AUTO: 0.25 K/UL
EOSINOPHIL NFR BLD AUTO: 2.9 %
ESTIMATED AVERAGE GLUCOSE: 108 MG/DL
GLUCOSE QUALITATIVE U: NEGATIVE
GLUCOSE SERPL-MCNC: 85 MG/DL
HBA1C MFR BLD HPLC: 5.4 %
HCT VFR BLD CALC: 44 %
HDLC SERPL-MCNC: 59 MG/DL
HGB BLD-MCNC: 14.8 G/DL
HYALINE CASTS: 0 /LPF
IMM GRANULOCYTES NFR BLD AUTO: 0.2 %
INR PPP: 1.04 RATIO
KETONES URINE: NEGATIVE
LDLC SERPL CALC-MCNC: 65 MG/DL
LEUKOCYTE ESTERASE URINE: NEGATIVE
LYMPHOCYTES # BLD AUTO: 2.39 K/UL
LYMPHOCYTES NFR BLD AUTO: 27.9 %
MAGNESIUM SERPL-MCNC: 1.9 MG/DL
MAN DIFF?: NORMAL
MCHC RBC-ENTMCNC: 31.1 PG
MCHC RBC-ENTMCNC: 33.6 GM/DL
MCV RBC AUTO: 92.4 FL
MICROALBUMIN 24H UR DL<=1MG/L-MCNC: <1.2 MG/DL
MICROALBUMIN/CREAT 24H UR-RTO: NORMAL MG/G
MICROSCOPIC-UA: NORMAL
MONOCYTES # BLD AUTO: 0.69 K/UL
MONOCYTES NFR BLD AUTO: 8.1 %
NEUTROPHILS # BLD AUTO: 5.16 K/UL
NEUTROPHILS NFR BLD AUTO: 60.3 %
NITRITE URINE: NEGATIVE
NONHDLC SERPL-MCNC: 79 MG/DL
PH URINE: 5.5
PLATELET # BLD AUTO: 224 K/UL
POTASSIUM SERPL-SCNC: 5 MMOL/L
PROT SERPL-MCNC: 7.5 G/DL
PROTEIN URINE: NEGATIVE
PT BLD: 12.2 SEC
RBC # BLD: 4.76 M/UL
RBC # FLD: 12.3 %
RED BLOOD CELLS URINE: 0 /HPF
SODIUM SERPL-SCNC: 141 MMOL/L
SPECIFIC GRAVITY URINE: 1.03
SQUAMOUS EPITHELIAL CELLS: 0 /HPF
TRIGL SERPL-MCNC: 69 MG/DL
TSH SERPL-ACNC: 2.02 UIU/ML
UROBILINOGEN URINE: NORMAL
WBC # FLD AUTO: 8.56 K/UL
WHITE BLOOD CELLS URINE: 0 /HPF

## 2021-11-09 NOTE — HISTORY OF PRESENT ILLNESS
[FreeTextEntry1] : \par \par 59 y/o male with h/o HL, former smoker, cad, possible sarcoidosis/mixed mineral dust pneumoconiosis , overweight who presents for f/up today and preprocedure for colonoscopy\par \par Last seen \par not taking asa currently for cad\par started celebrex last year \par \par denies cp, sob, palpitations, lh, syncope, edema\par \par -Echo : ef 60%, no wma, trace tr\par -CTA : several scattered solid nodules up to 5mm, likely inflammatory, minimal emphysema, small and large airway disease, borderline dilated asc ao 3.8cm, calcium score 13 65%, minimal prox LAD, minimal RPDA\par \par started on lipitor\par seen by pulm - latest visit  - ordered for pft and 6mwt and chest CT, sleep study\par on Breo\par \par seen by GI for rectal bleed episode, planned for colo for \par \par \par noted to have snoring by wife\par quit smoking , h/o 30 year use\par Not actively exercising\par walks a lot with work\par \par \par PMH/PSH:\par HL\par leg surgery right \par overweight\par cad\par possible sarcoidosis\par Mixed mineral dust pneumoconiosis \par \par \par ALL:\par nkda\par \par \par \par MEDS:\par lipitor 20 mg qhs\par breo\par celebrex 200 mg qd\par \par \par SH:\par no tobacco - prior 1ppd x 30 years\par no etoh/drugs\[place] \par works construction\par live with fiance\par no children\par \par \par \par FH:\par mother - dm,  84\par father - cva,  89\par 9 sisters - alive, healthy\par

## 2021-11-09 NOTE — DISCUSSION/SUMMARY
[Patient] : the patient [___ Month(s)] : in [unfilled] month(s) [FreeTextEntry1] : \par 61 y/o male with h/o HL, former smoker, cad, possible sarcoidosis/mixed mineral dust pneumoconiosis , overweight who presents for f/up today and preprocedure for colonoscopy\par patient asymptomatic, can perform > 4 METS\par has stable cad, no h/o chf, rf, dm, cva\par \par -Echo 2020: ef 60%, no wma, trace tr\par -CTA 8/20: several scattered solid nodules up to 5mm, likely inflammatory, minimal emphysema, small and large airway disease, borderline dilated asc ao 3.8cm, calcium score 13 65%, minimal prox LAD, minimal RPDA\par -ordered ekg today - nsr, normal intervals, no st/t changes\par -ordered labs today for preprocedure which are wnl\par -gi recs for rectal bleed\par -pulm f/up\par -smoking cessation continuation\par -continue statin\par -hold asa for now pending colonscopy, will discuss restart post colo\par -counseled on risk of celebrex and minimizing use\par -close monitoring bp - recently elevated diastolic\par -counseled on cvd risk factors\par -f/up 2 months for bp\par \par He is low risk for low risk procedure and can proceed for colonoscopy without further testing.\par \par I have spent 40 minutes reviewing labs, records, tests and discussing cad, cvd risk factors management

## 2021-12-02 ENCOUNTER — APPOINTMENT (OUTPATIENT)
Dept: GASTROENTEROLOGY | Facility: CLINIC | Age: 61
End: 2021-12-02

## 2021-12-06 ENCOUNTER — APPOINTMENT (OUTPATIENT)
Dept: GASTROENTEROLOGY | Facility: AMBULATORY MEDICAL SERVICES | Age: 61
End: 2021-12-06
Payer: COMMERCIAL

## 2021-12-06 PROCEDURE — 45380 COLONOSCOPY AND BIOPSY: CPT | Mod: 33

## 2022-02-07 ENCOUNTER — APPOINTMENT (OUTPATIENT)
Dept: HEART AND VASCULAR | Facility: CLINIC | Age: 62
End: 2022-02-07

## 2022-02-28 ENCOUNTER — RX RENEWAL (OUTPATIENT)
Age: 62
End: 2022-02-28

## 2022-04-11 PROBLEM — Z11.59 SCREENING FOR VIRAL DISEASE: Status: ACTIVE | Noted: 2020-08-24

## 2022-11-14 ENCOUNTER — RX RENEWAL (OUTPATIENT)
Age: 62
End: 2022-11-14

## 2023-02-23 ENCOUNTER — APPOINTMENT (OUTPATIENT)
Dept: HEART AND VASCULAR | Facility: CLINIC | Age: 63
End: 2023-02-23
Payer: COMMERCIAL

## 2023-02-23 ENCOUNTER — NON-APPOINTMENT (OUTPATIENT)
Age: 63
End: 2023-02-23

## 2023-02-23 VITALS
TEMPERATURE: 98 F | HEART RATE: 57 BPM | OXYGEN SATURATION: 95 % | DIASTOLIC BLOOD PRESSURE: 89 MMHG | HEIGHT: 68 IN | SYSTOLIC BLOOD PRESSURE: 147 MMHG | BODY MASS INDEX: 29.7 KG/M2 | WEIGHT: 196 LBS

## 2023-02-23 PROCEDURE — 99214 OFFICE O/P EST MOD 30 MIN: CPT | Mod: 25

## 2023-02-23 PROCEDURE — 36415 COLL VENOUS BLD VENIPUNCTURE: CPT

## 2023-02-23 PROCEDURE — 93000 ELECTROCARDIOGRAM COMPLETE: CPT

## 2023-02-23 NOTE — DISCUSSION/SUMMARY
[Patient] : the patient [___ Month(s)] : in [unfilled] month(s) [EKG obtained to assist in diagnosis and management of assessed problem(s)] : EKG obtained to assist in diagnosis and management of assessed problem(s) [FreeTextEntry1] : 63 y/o male with h/o HL, former smoker, cad, possible sarcoidosis/mixed mineral dust pneumoconiosis , overweight who presents for f/up today \par \par -Echo 2020: ef 60%, no wma, trace tr\par -CTA 8/20: several scattered solid nodules up to 5mm, likely inflammatory, minimal emphysema, small and large airway disease, borderline dilated asc ao 3.8cm, calcium score 13 65%, minimal prox LAD, minimal RPDA\par -ordered ekg today - SB, normal intervals, no st/t changes\par -labs 2021 reviewed, ordered labs today\par -gi f/up - colo showed polyp, hemorhoids\par -pulm f/up\par -smoking cessation continuation\par -continue statin\par -advised he resume asa\par -counseled on risk of celebrex and minimizing use\par -close monitoring bp \par -counseled on cvd risk factors\par -f/up 6 months for bp\par \par  \par \par I have spent 30 minutes reviewing labs, records, tests and discussing cad, cvd risk factors management. \par

## 2023-02-23 NOTE — HISTORY OF PRESENT ILLNESS
[FreeTextEntry1] : 61 y/o male with h/o HL, former smoker, cad, possible sarcoidosis/mixed mineral dust pneumoconiosis , overweight who presents for f/up today \par \par last seen  \par \par not taking asa currently for cad\par  \par \par denies cp, sob, palpitations, lh, syncope, edema\par \par -Echo : ef 60%, no wma, trace tr\par -CTA : several scattered solid nodules up to 5mm, likely inflammatory, minimal emphysema, small and large airway disease, borderline dilated asc ao 3.8cm, calcium score 13 65%, minimal prox LAD, minimal RPDA\par \par seen by pulm -\par on Breo\par \par seen by GI for rectal bleed episode, colo showed polyps, hemorrhoids\par \par \par noted to have snoring by wife\par quit smoking , h/o 30 year use\par Not actively exercising\par walks a lot with work\par \par \par PMH/PSH:\par HL\par leg surgery right \par overweight\par cad\par possible sarcoidosis\par Mixed mineral dust pneumoconiosis \par \par \par ALL:\par nkda\par \par \par \par MEDS:\par lipitor 20 mg qhs\par breo\par celebrex 200 mg qd\par \par \par SH:\par no tobacco - prior 1ppd x 30 years\par no etoh/drugs\[place] from [place] lived [place] \par works construction\par live with fiance\par no children\par \par \par \par FH:\par mother - dm,  84\par father - cva,  89\par 9 sisters - alive, healthy\par

## 2023-02-27 LAB
ALBUMIN SERPL ELPH-MCNC: 4.8 G/DL
ALP BLD-CCNC: 71 U/L
ALT SERPL-CCNC: 51 U/L
ANION GAP SERPL CALC-SCNC: 12 MMOL/L
APPEARANCE: CLEAR
AST SERPL-CCNC: 46 U/L
BACTERIA: NORMAL /HPF
BILIRUB SERPL-MCNC: 1.5 MG/DL
BILIRUBIN URINE: NEGATIVE
BLOOD URINE: NEGATIVE
BUN SERPL-MCNC: 17 MG/DL
CALCIUM SERPL-MCNC: 10.4 MG/DL
CHLORIDE SERPL-SCNC: 100 MMOL/L
CHOLEST SERPL-MCNC: 190 MG/DL
CO2 SERPL-SCNC: 25 MMOL/L
COLOR: YELLOW
CREAT SERPL-MCNC: 0.68 MG/DL
CREAT SPEC-SCNC: 110 MG/DL
EGFR: 106 ML/MIN/1.73M2
GLUCOSE QUALITATIVE U: NEGATIVE
GLUCOSE SERPL-MCNC: 90 MG/DL
HDLC SERPL-MCNC: 57 MG/DL
KETONES URINE: NEGATIVE
LDLC SERPL CALC-MCNC: 98 MG/DL
LEUKOCYTE ESTERASE URINE: NEGATIVE
MAGNESIUM SERPL-MCNC: 2 MG/DL
MICROALBUMIN 24H UR DL<=1MG/L-MCNC: <1.2 MG/DL
MICROALBUMIN/CREAT 24H UR-RTO: <11 MG/G
MICROSCOPIC-UA: NEGATIVE
NITRITE URINE: NEGATIVE
NONHDLC SERPL-MCNC: 134 MG/DL
PH URINE: 6
POTASSIUM SERPL-SCNC: 6 MMOL/L
PROT SERPL-MCNC: 7.7 G/DL
PROTEIN URINE: NEGATIVE MG/DL
RED BLOOD CELLS URINE: < 5 /HPF
SODIUM SERPL-SCNC: 137 MMOL/L
SPECIFIC GRAVITY URINE: >=1.03
SQUAMOUS EPITHELIAL CELLS: NORMAL /HPF
TRIGL SERPL-MCNC: 180 MG/DL
TSH SERPL-ACNC: 0.89 UIU/ML
UROBILINOGEN URINE: 0.2 E.U./DL
WHITE BLOOD CELLS URINE: < 5 /HPF

## 2023-02-27 RX ORDER — ATORVASTATIN CALCIUM 40 MG/1
40 TABLET, FILM COATED ORAL
Qty: 1 | Refills: 1 | Status: ACTIVE | COMMUNITY
Start: 2020-08-17 | End: 1900-01-01

## 2023-03-04 ENCOUNTER — LABORATORY RESULT (OUTPATIENT)
Age: 63
End: 2023-03-04

## 2023-05-05 DIAGNOSIS — R73.09 OTHER ABNORMAL GLUCOSE: ICD-10-CM

## 2023-05-06 ENCOUNTER — LABORATORY RESULT (OUTPATIENT)
Age: 63
End: 2023-05-06

## 2023-05-14 ENCOUNTER — NON-APPOINTMENT (OUTPATIENT)
Age: 63
End: 2023-05-14

## 2023-05-23 ENCOUNTER — APPOINTMENT (OUTPATIENT)
Dept: PULMONOLOGY | Facility: CLINIC | Age: 63
End: 2023-05-23

## 2023-06-14 ENCOUNTER — APPOINTMENT (OUTPATIENT)
Dept: GASTROENTEROLOGY | Facility: CLINIC | Age: 63
End: 2023-06-14

## 2023-06-17 ENCOUNTER — LABORATORY RESULT (OUTPATIENT)
Age: 63
End: 2023-06-17

## 2023-07-12 ENCOUNTER — APPOINTMENT (OUTPATIENT)
Dept: GASTROENTEROLOGY | Facility: CLINIC | Age: 63
End: 2023-07-12
Payer: COMMERCIAL

## 2023-07-12 VITALS
SYSTOLIC BLOOD PRESSURE: 153 MMHG | HEART RATE: 75 BPM | OXYGEN SATURATION: 96 % | DIASTOLIC BLOOD PRESSURE: 81 MMHG | WEIGHT: 184 LBS | HEIGHT: 68 IN | BODY MASS INDEX: 27.89 KG/M2

## 2023-07-12 DIAGNOSIS — R79.89 OTHER SPECIFIED ABNORMAL FINDINGS OF BLOOD CHEMISTRY: ICD-10-CM

## 2023-07-12 PROCEDURE — 99214 OFFICE O/P EST MOD 30 MIN: CPT

## 2023-07-12 NOTE — HISTORY OF PRESENT ILLNESS
[FreeTextEntry1] : 61 y/o male with h/o HL, former smoker, cad, possible sarcoidosis/mixed mineral dust pneumoconiosis , overweight who presents for evaluation of elevated liver enzymes \par \par Patient cardiologist Mohini referred for evaluation of LFTs from 2/27/23 AST 46 and ALT 51 \par Follow up LFts 3/6/23 AST 34 and ALT 46 , 5/8\par  AST 34 and ALT 36 \par \par Patient has not had   a abdominal sonogram \par  \par \par Denies rectal bleeding, blood in the stool, melena. or hematemesis.

## 2023-07-12 NOTE — PHYSICAL EXAM
[Alert] : alert [Normal Voice/Communication] : normal voice/communication [Healthy Appearing] : healthy appearing [No Acute Distress] : no acute distress [Sclera] : the sclera and conjunctiva were normal [Hearing Threshold Finger Rub Not [place]] : hearing was normal [Normal Lips/Gums] : the lips and gums were normal [Oropharynx] : the oropharynx was normal [Normal Appearance] : the appearance of the neck was normal [No Neck Mass] : no neck mass was observed [No Respiratory Distress] : no respiratory distress [No Acc Muscle Use] : no accessory muscle use [Respiration, Rhythm And Depth] : normal respiratory rhythm and effort [Auscultation Breath Sounds / Voice Sounds] : lungs were clear to auscultation bilaterally [Heart Rate And Rhythm] : heart rate was normal and rhythm regular [Normal S1, S2] : normal S1 and S2 [Murmurs] : no murmurs [Bowel Sounds] : normal bowel sounds [Abdomen Tenderness] : non-tender [No Masses] : no abdominal mass palpated [Abdomen Soft] : soft [] : no hepatosplenomegaly [Oriented To Time, Place, And Person] : oriented to person, place, and time

## 2023-07-12 NOTE — ASSESSMENT
[FreeTextEntry1] : Elevated LFTs from /  possible fatty liver patient will need abdominal sonogram. \par PAtient verbalized understanding of all information provided. All questions answered and reviewed. \par \par I spent 45 minutes with the patient as well as reviewing documents prior to and after the office visit\par

## 2023-08-09 ENCOUNTER — APPOINTMENT (OUTPATIENT)
Dept: PULMONOLOGY | Facility: CLINIC | Age: 63
End: 2023-08-09
Payer: COMMERCIAL

## 2023-08-09 VITALS
BODY MASS INDEX: 27.89 KG/M2 | OXYGEN SATURATION: 98 % | SYSTOLIC BLOOD PRESSURE: 149 MMHG | WEIGHT: 184 LBS | DIASTOLIC BLOOD PRESSURE: 81 MMHG | HEIGHT: 68 IN | HEART RATE: 76 BPM | TEMPERATURE: 98.1 F

## 2023-08-09 DIAGNOSIS — R93.89 ABNORMAL FINDINGS ON DIAGNOSTIC IMAGING OF OTHER SPECIFIED BODY STRUCTURES: ICD-10-CM

## 2023-08-09 DIAGNOSIS — R06.83 SNORING: ICD-10-CM

## 2023-08-09 DIAGNOSIS — R06.02 SHORTNESS OF BREATH: ICD-10-CM

## 2023-08-09 PROCEDURE — G0296 VISIT TO DETERM LDCT ELIG: CPT

## 2023-08-09 PROCEDURE — 99205 OFFICE O/P NEW HI 60 MIN: CPT | Mod: 25

## 2023-08-09 NOTE — ASSESSMENT
[FreeTextEntry1] : For home sleep study. Patient to return for lung function testing. For CT Lung Cancer Screening.  Discussed bronchodilator therapy.  Explained Breo is not in as needed drug and recommended he change to Symbicort which can be used regularly or PRN. Further recommendations after review of above.  80-minute spent in evaluation management and review of studies as well as chart and discussion.

## 2023-08-09 NOTE — HISTORY OF PRESENT ILLNESS
[Former] : former [Awakes Unrefreshed] : awakes unrefreshed [Fatigue] : fatigue [Snoring] : snoring [Unintentional Sleep while Inactive] : unintentional sleep while inactive [TextBox_4] : PHYLICIA SILVEIRA is a 62 year old  M referred for pulmonary evaluation.  Many years ago had TBBX diagnosed sarcoid. Never treated.  Takes Breo intermittently. Presently no cough, wheezing or chest pain. Some left shoulder pain. Some SOB when working around dust and takes Breo.   Past pulmonary history. COPD and abnormal CT.  ? Sarcoidosis. ? 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  Family history of pulmonary disease.  N Recent travel  N Pets Dog not allergic.   Never did sleep study. Does not want.  [TextBox_11] : 1 [TextBox_13] : 30 [YearQuit] : 2018 [Difficulty Maintaining Sleep] : does not have difficulty maintaining sleep [Frequent Nocturnal Awakening] : denies frequent nocturnal awakening [Recent  Weight Gain] : no recent weight gain

## 2023-08-09 NOTE — PHYSICAL EXAM
[No Acute Distress] : no acute distress [Normal Oropharynx] : normal oropharynx [Normal Appearance] : normal appearance [No Neck Mass] : no neck mass [Normal Rate/Rhythm] : normal rate/rhythm [Normal S1, S2] : normal s1, s2 [No Murmurs] : no murmurs [No Resp Distress] : no resp distress [Clear to Auscultation Bilaterally] : clear to auscultation bilaterally [No Abnormalities] : no abnormalities [Benign] : benign [Not Tender] : not tender [No HSM] : no hsm [Normal Gait] : normal gait [No Clubbing] : no clubbing [No Cyanosis] : no cyanosis [No Edema] : no edema [FROM] : FROM [Normal Color/ Pigmentation] : normal color/ pigmentation [No Focal Deficits] : no focal deficits [Oriented x3] : oriented x3 [Normal Affect] : normal affect

## 2023-08-09 NOTE — DISCUSSION/SUMMARY
[FreeTextEntry1] : R/O SONG Pulmonary Nodules Mediastinal adenopathy on prior CAT scan.  Most likely related to sarcoidosis. Rule out pneumoconiosis. Possible component of COPD. Former Smoker.  30 pack years discontinued 2018.

## 2023-08-17 ENCOUNTER — APPOINTMENT (OUTPATIENT)
Dept: PULMONOLOGY | Facility: CLINIC | Age: 63
End: 2023-08-17

## 2023-09-21 ENCOUNTER — APPOINTMENT (OUTPATIENT)
Dept: HEART AND VASCULAR | Facility: CLINIC | Age: 63
End: 2023-09-21

## 2023-10-03 ENCOUNTER — NON-APPOINTMENT (OUTPATIENT)
Age: 63
End: 2023-10-03

## 2023-10-03 VITALS — BODY MASS INDEX: 27.89 KG/M2 | WEIGHT: 184 LBS | HEIGHT: 68 IN

## 2023-10-03 DIAGNOSIS — Z87.891 PERSONAL HISTORY OF NICOTINE DEPENDENCE: ICD-10-CM

## 2023-10-05 RX ORDER — BUDESONIDE AND FORMOTEROL FUMARATE DIHYDRATE 160; 4.5 UG/1; UG/1
160-4.5 AEROSOL RESPIRATORY (INHALATION) TWICE DAILY
Qty: 3 | Refills: 3 | Status: ACTIVE | COMMUNITY
Start: 2023-08-09 | End: 1900-01-01

## 2023-10-14 ENCOUNTER — APPOINTMENT (OUTPATIENT)
Dept: ULTRASOUND IMAGING | Facility: CLINIC | Age: 63
End: 2023-10-14

## 2023-10-14 ENCOUNTER — APPOINTMENT (OUTPATIENT)
Dept: CT IMAGING | Facility: CLINIC | Age: 63
End: 2023-10-14

## 2024-05-15 ENCOUNTER — EMERGENCY (EMERGENCY)
Facility: HOSPITAL | Age: 64
LOS: 1 days | Discharge: HOME | End: 2024-05-15
Attending: EMERGENCY MEDICINE | Admitting: EMERGENCY MEDICINE
Payer: COMMERCIAL

## 2024-05-15 VITALS
OXYGEN SATURATION: 100 % | SYSTOLIC BLOOD PRESSURE: 123 MMHG | HEART RATE: 62 BPM | TEMPERATURE: 98 F | RESPIRATION RATE: 18 BRPM | DIASTOLIC BLOOD PRESSURE: 75 MMHG

## 2024-05-15 VITALS
TEMPERATURE: 98 F | SYSTOLIC BLOOD PRESSURE: 151 MMHG | RESPIRATION RATE: 16 BRPM | WEIGHT: 184.97 LBS | HEIGHT: 68 IN | DIASTOLIC BLOOD PRESSURE: 95 MMHG | HEART RATE: 78 BPM | OXYGEN SATURATION: 96 %

## 2024-05-15 LAB
ALBUMIN SERPL ELPH-MCNC: 4.1 G/DL — SIGNIFICANT CHANGE UP (ref 3.3–5)
ALP SERPL-CCNC: 76 U/L — SIGNIFICANT CHANGE UP (ref 40–120)
ALT FLD-CCNC: 37 U/L — SIGNIFICANT CHANGE UP (ref 4–41)
ANION GAP SERPL CALC-SCNC: 13 MMOL/L — SIGNIFICANT CHANGE UP (ref 7–14)
APPEARANCE UR: CLEAR — SIGNIFICANT CHANGE UP
AST SERPL-CCNC: 28 U/L — SIGNIFICANT CHANGE UP (ref 4–40)
BACTERIA # UR AUTO: NEGATIVE /HPF — SIGNIFICANT CHANGE UP
BASE EXCESS BLDV CALC-SCNC: 0.9 MMOL/L — SIGNIFICANT CHANGE UP (ref -2–3)
BASOPHILS # BLD AUTO: 0.01 K/UL — SIGNIFICANT CHANGE UP (ref 0–0.2)
BASOPHILS NFR BLD AUTO: 0.1 % — SIGNIFICANT CHANGE UP (ref 0–2)
BILIRUB SERPL-MCNC: 0.8 MG/DL — SIGNIFICANT CHANGE UP (ref 0.2–1.2)
BILIRUB UR-MCNC: NEGATIVE — SIGNIFICANT CHANGE UP
BLOOD GAS VENOUS COMPREHENSIVE RESULT: SIGNIFICANT CHANGE UP
BUN SERPL-MCNC: 23 MG/DL — SIGNIFICANT CHANGE UP (ref 7–23)
CALCIUM SERPL-MCNC: 9.3 MG/DL — SIGNIFICANT CHANGE UP (ref 8.4–10.5)
CAST: 0 /LPF — SIGNIFICANT CHANGE UP (ref 0–4)
CHLORIDE BLDV-SCNC: 105 MMOL/L — SIGNIFICANT CHANGE UP (ref 96–108)
CHLORIDE SERPL-SCNC: 101 MMOL/L — SIGNIFICANT CHANGE UP (ref 98–107)
CO2 BLDV-SCNC: 26.5 MMOL/L — HIGH (ref 22–26)
CO2 SERPL-SCNC: 23 MMOL/L — SIGNIFICANT CHANGE UP (ref 22–31)
COLOR SPEC: YELLOW — SIGNIFICANT CHANGE UP
CREAT SERPL-MCNC: 0.79 MG/DL — SIGNIFICANT CHANGE UP (ref 0.5–1.3)
DIFF PNL FLD: ABNORMAL
EGFR: 101 ML/MIN/1.73M2 — SIGNIFICANT CHANGE UP
EOSINOPHIL # BLD AUTO: 0.03 K/UL — SIGNIFICANT CHANGE UP (ref 0–0.5)
EOSINOPHIL NFR BLD AUTO: 0.3 % — SIGNIFICANT CHANGE UP (ref 0–6)
GAS PNL BLDV: 134 MMOL/L — LOW (ref 136–145)
GAS PNL BLDV: SIGNIFICANT CHANGE UP
GLUCOSE BLDV-MCNC: 119 MG/DL — HIGH (ref 70–99)
GLUCOSE SERPL-MCNC: 117 MG/DL — HIGH (ref 70–99)
GLUCOSE UR QL: NEGATIVE MG/DL — SIGNIFICANT CHANGE UP
HCO3 BLDV-SCNC: 25 MMOL/L — SIGNIFICANT CHANGE UP (ref 22–29)
HCT VFR BLD CALC: 39.6 % — SIGNIFICANT CHANGE UP (ref 39–50)
HCT VFR BLDA CALC: 43 % — SIGNIFICANT CHANGE UP (ref 39–51)
HGB BLD CALC-MCNC: 14.3 G/DL — SIGNIFICANT CHANGE UP (ref 12.6–17.4)
HGB BLD-MCNC: 14 G/DL — SIGNIFICANT CHANGE UP (ref 13–17)
IANC: 9.61 K/UL — HIGH (ref 1.8–7.4)
IMM GRANULOCYTES NFR BLD AUTO: 0.4 % — SIGNIFICANT CHANGE UP (ref 0–0.9)
KETONES UR-MCNC: NEGATIVE MG/DL — SIGNIFICANT CHANGE UP
LACTATE BLDV-MCNC: 1.4 MMOL/L — SIGNIFICANT CHANGE UP (ref 0.5–2)
LEUKOCYTE ESTERASE UR-ACNC: NEGATIVE — SIGNIFICANT CHANGE UP
LIDOCAIN IGE QN: 27 U/L — SIGNIFICANT CHANGE UP (ref 7–60)
LYMPHOCYTES # BLD AUTO: 0.91 K/UL — LOW (ref 1–3.3)
LYMPHOCYTES # BLD AUTO: 8.2 % — LOW (ref 13–44)
MCHC RBC-ENTMCNC: 31.3 PG — SIGNIFICANT CHANGE UP (ref 27–34)
MCHC RBC-ENTMCNC: 35.4 GM/DL — SIGNIFICANT CHANGE UP (ref 32–36)
MCV RBC AUTO: 88.6 FL — SIGNIFICANT CHANGE UP (ref 80–100)
MONOCYTES # BLD AUTO: 0.49 K/UL — SIGNIFICANT CHANGE UP (ref 0–0.9)
MONOCYTES NFR BLD AUTO: 4.4 % — SIGNIFICANT CHANGE UP (ref 2–14)
NEUTROPHILS # BLD AUTO: 9.61 K/UL — HIGH (ref 1.8–7.4)
NEUTROPHILS NFR BLD AUTO: 86.6 % — HIGH (ref 43–77)
NITRITE UR-MCNC: NEGATIVE — SIGNIFICANT CHANGE UP
NRBC # BLD: 0 /100 WBCS — SIGNIFICANT CHANGE UP (ref 0–0)
NRBC # FLD: 0 K/UL — SIGNIFICANT CHANGE UP (ref 0–0)
PCO2 BLDV: 39 MMHG — LOW (ref 42–55)
PH BLDV: 7.42 — SIGNIFICANT CHANGE UP (ref 7.32–7.43)
PH UR: 6.5 — SIGNIFICANT CHANGE UP (ref 5–8)
PLATELET # BLD AUTO: 205 K/UL — SIGNIFICANT CHANGE UP (ref 150–400)
PO2 BLDV: 69 MMHG — HIGH (ref 25–45)
POTASSIUM BLDV-SCNC: 4.6 MMOL/L — SIGNIFICANT CHANGE UP (ref 3.5–5.1)
POTASSIUM SERPL-MCNC: 4.1 MMOL/L — SIGNIFICANT CHANGE UP (ref 3.5–5.3)
POTASSIUM SERPL-SCNC: 4.1 MMOL/L — SIGNIFICANT CHANGE UP (ref 3.5–5.3)
PROT SERPL-MCNC: 7 G/DL — SIGNIFICANT CHANGE UP (ref 6–8.3)
PROT UR-MCNC: NEGATIVE MG/DL — SIGNIFICANT CHANGE UP
RBC # BLD: 4.47 M/UL — SIGNIFICANT CHANGE UP (ref 4.2–5.8)
RBC # FLD: 12.2 % — SIGNIFICANT CHANGE UP (ref 10.3–14.5)
RBC CASTS # UR COMP ASSIST: 4 /HPF — SIGNIFICANT CHANGE UP (ref 0–4)
SAO2 % BLDV: 94.5 % — HIGH (ref 67–88)
SODIUM SERPL-SCNC: 137 MMOL/L — SIGNIFICANT CHANGE UP (ref 135–145)
SP GR SPEC: 1.04 — HIGH (ref 1–1.03)
SQUAMOUS # UR AUTO: 0 /HPF — SIGNIFICANT CHANGE UP (ref 0–5)
UROBILINOGEN FLD QL: 0.2 MG/DL — SIGNIFICANT CHANGE UP (ref 0.2–1)
WBC # BLD: 11.09 K/UL — HIGH (ref 3.8–10.5)
WBC # FLD AUTO: 11.09 K/UL — HIGH (ref 3.8–10.5)
WBC UR QL: 0 /HPF — SIGNIFICANT CHANGE UP (ref 0–5)

## 2024-05-15 PROCEDURE — 74177 CT ABD & PELVIS W/CONTRAST: CPT | Mod: 26,MC

## 2024-05-15 PROCEDURE — 99285 EMERGENCY DEPT VISIT HI MDM: CPT

## 2024-05-15 RX ORDER — SODIUM CHLORIDE 9 MG/ML
1000 INJECTION INTRAMUSCULAR; INTRAVENOUS; SUBCUTANEOUS ONCE
Refills: 0 | Status: COMPLETED | OUTPATIENT
Start: 2024-05-15 | End: 2024-05-15

## 2024-05-15 RX ADMIN — SODIUM CHLORIDE 1000 MILLILITER(S): 9 INJECTION INTRAMUSCULAR; INTRAVENOUS; SUBCUTANEOUS at 05:09

## 2024-05-15 NOTE — ED PROVIDER NOTE - ATTENDING CONTRIBUTION TO CARE
64 y/o M with h/o hyperlipidemia p/w LLQ pain since 8pm tonight.  Pain constant, improved with celebrex taken earlier tonight.  Associated with nausea and one episode of nbnb vomiting.  No fever, chills, back pain, constipation, diarrhea, urinary sxs, testicular pain or swelling.      Well appearing, lying comfortably in stretcher, awake and alert, nontoxic.  AF/VSS.  Lungs cta bl.  Cards nl S1/S2, RRR, no MRG.  Abd soft nondistended no cvat, mild ttp LLQ/left pelvic region without rebound or guarding.  No pedal edema or calf tenderness.    Concern for diverticulitis, renal colic.  Plan for labs, ua, ct a/p, pain meds, reassess.

## 2024-05-15 NOTE — ED PROVIDER NOTE - NSFOLLOWUPINSTRUCTIONS_ED_ALL_ED_FT
You were seen in our department for Abdominal pain    Follow up with your Primary Care Doctor in 48-72 hours for further monitoring.  Follow up with Gastroenterologist within 1 week for further evaluation.    if you develop any chest pain, dizziness, high fevers, weakness, numbness, tingling, vision changes, or any worsening symptoms return to our ED for evaluation.

## 2024-05-15 NOTE — ED ADULT NURSE NOTE - OBJECTIVE STATEMENT
Pt received in room 26, aaox3, ambulatory, breathing even and unlabored in bed. Pt states that he has been experiencing LLQ pain for the past day with associated N/V/D. Pt not currently experiencing pain or N/V. Pt denies chest pain, SOB, dizziness, headache, blurry vision, chills. Bed in lowest position, call bell within reach. #20G IV placed in the right AC, labs drawn and sent.

## 2024-05-15 NOTE — ED ADULT TRIAGE NOTE - CHIEF COMPLAINT QUOTE
Pt c/o RLQ pain since 10pm last night, also endorsing fevers, sweaty, and vomiting. Bowel movement last night. Past medical history of HLD. Pt appears well in triage. Pt c/o LLQ pain since 10pm last night, also endorsing intermittent fevers, sweaty, and vomiting. Bowel movement last night. Past medical history of HLD. Pt appears well in triage.

## 2024-05-15 NOTE — ED ADULT NURSE REASSESSMENT NOTE - NS ED NURSE REASSESS COMMENT FT1
Pt at baseline mental status, breathing even and unlabored in bed. Pt denies chest pain, SOB, dizziness, headache, blurry vision, chills, N/V. Bed in lowest position, call bell within reach.

## 2024-05-15 NOTE — ED PROVIDER NOTE - BIRTH SEX
[FreeTextEntry1] : Patient appears to be doing fairly well over the past 2 months. She has stopped smoking. We'll check routine labs. Patient is to call for results this week.
Male

## 2024-05-15 NOTE — ED PROVIDER NOTE - PHYSICAL EXAMINATION
GENERAL: Awake, alert, NAD  HEENT: NC/AT, moist mucous membranes, PERRL, EOMI  LUNGS: CTAB, no wheezes or crackles   CARDIAC: RRR, no m/r/g  ABDOMEN: Soft, + TTP LLQ, non distended, no rebound, no guarding  BACK: No midline spinal tenderness, no CVA tenderness  EXT: No edema, no calf tenderness, 2+ DP pulses bilaterally, no deformities.  NEURO: A&Ox3. Moving all extremities.  SKIN: Warm and dry. No rash.  PSYCH: Normal affect.

## 2024-05-15 NOTE — ED ADULT NURSE NOTE - NSFALLUNIVINTERV_ED_ALL_ED
Bed/Stretcher in lowest position, wheels locked, appropriate side rails in place/Call bell, personal items and telephone in reach/Instruct patient to call for assistance before getting out of bed/chair/stretcher/Non-slip footwear applied when patient is off stretcher/[place] to call system/Physically safe environment - no spills, clutter or unnecessary equipment/Purposeful proactive rounding/Room/bathroom lighting operational, light cord in reach

## 2024-05-15 NOTE — ED PROVIDER NOTE - PROGRESS NOTE DETAILS
STEPHANIE Wade: Patient seen resting comfortably and NAD. Upon reassessment Patient reports improvement of symptoms.  Nontender to palpation in LLQ. CT showed left perinephric stranding. UA negative for UTI. Possible Kidney stone could have been passed. Labs/imaging discussed and reviewed with patient. Patient HDS, ambulating well and tolerating PO. Return precautions given upon discharge.

## 2024-05-15 NOTE — ED ADULT NURSE NOTE - CHIEF COMPLAINT QUOTE
Pt c/o LLQ pain since 10pm last night, also endorsing intermittent fevers, sweaty, and vomiting. Bowel movement last night. Past medical history of HLD. Pt appears well in triage.

## 2024-05-15 NOTE — ED PROVIDER NOTE - OBJECTIVE STATEMENT
63-year-old male history of hyperlipidemia presenting for abdominal pain.  The patient reports that his pain began at 8 AM yesterday morning but progressively got worse throughout the day.  The patient reports that 10 PM he started having severe abdominal pain in the left lower quadrant and vomited twice.  The patient states that around 2 AM he took Celebrex for his pain but the pain continued.  The patient also reports becoming diaphoretic feeling hot and feverish.  The patient states that he has never had these symptoms before.  The patient states that his last colonoscopy was less than 5 years and there was something abnormal but he does not remember what it was specifically.  This patient denies chest pain, shortness of breath, headache, visual changes, urinary symptoms.

## 2024-05-15 NOTE — ED PROVIDER NOTE - CLINICAL SUMMARY MEDICAL DECISION MAKING FREE TEXT BOX
63-year-old male history of hyperlipidemia presenting for abdominal pain.  The patient reports that his pain began at 8 AM yesterday morning but progressively got worse throughout the day. The patient states that his last colonoscopy was less than 5 years and there was something abnormal but he does not remember what it was specifically. This patient denies chest pain, shortness of breath, headache, visual changes, urinary symptoms. VS. non actionable. PE. TTP in LLQ. The differential includes but is not limited to renal colic, diverticulitis, less likely given location, low concern for urinary tract infection at this time as patient has no dysuria, no suprapubic tenderness.  Will obtain basic labs, lipase, CT abdomen and pelvis.  Patient at this time denying pain medication.  Dispo pending labs imaging and reassessment.

## 2024-05-15 NOTE — ED PROVIDER NOTE - PATIENT PORTAL LINK FT
You can access the FollowMyHealth Patient Portal offered by [facility] by registering at the following website: [email or web address]. By joining [facility]’s FollowMyHealth portal, you will also be able to view your health information using other applications (apps) compatible with our system.

## 2024-05-16 LAB
CULTURE RESULTS: NO GROWTH — SIGNIFICANT CHANGE UP
SPECIMEN SOURCE: SIGNIFICANT CHANGE UP

## 2024-06-15 PROBLEM — E78.5 HYPERLIPIDEMIA, UNSPECIFIED: Chronic | Status: ACTIVE | Noted: 2024-05-15

## 2024-06-24 ENCOUNTER — APPOINTMENT (OUTPATIENT)
Dept: ORTHOPEDIC SURGERY | Facility: CLINIC | Age: 64
End: 2024-06-24
Payer: COMMERCIAL

## 2024-06-24 VITALS
BODY MASS INDEX: 27.89 KG/M2 | OXYGEN SATURATION: 95 % | HEART RATE: 84 BPM | HEIGHT: 68 IN | DIASTOLIC BLOOD PRESSURE: 98 MMHG | TEMPERATURE: 98 F | WEIGHT: 184 LBS | SYSTOLIC BLOOD PRESSURE: 163 MMHG

## 2024-06-24 DIAGNOSIS — M54.50 LOW BACK PAIN, UNSPECIFIED: ICD-10-CM

## 2024-06-24 DIAGNOSIS — M54.16 RADICULOPATHY, LUMBAR REGION: ICD-10-CM

## 2024-06-24 PROCEDURE — 72110 X-RAY EXAM L-2 SPINE 4/>VWS: CPT

## 2024-06-24 PROCEDURE — 99205 OFFICE O/P NEW HI 60 MIN: CPT

## 2024-06-24 RX ORDER — DICLOFENAC SODIUM 75 MG/1
75 TABLET, DELAYED RELEASE ORAL
Qty: 40 | Refills: 3 | Status: ACTIVE | COMMUNITY
Start: 2024-06-24 | End: 1900-01-01

## 2024-07-03 ENCOUNTER — APPOINTMENT (OUTPATIENT)
Dept: PULMONOLOGY | Facility: CLINIC | Age: 64
End: 2024-07-03
Payer: COMMERCIAL

## 2024-07-03 VITALS
WEIGHT: 190 LBS | DIASTOLIC BLOOD PRESSURE: 88 MMHG | HEIGHT: 68 IN | TEMPERATURE: 98.4 F | OXYGEN SATURATION: 99 % | BODY MASS INDEX: 28.79 KG/M2 | HEART RATE: 76 BPM | SYSTOLIC BLOOD PRESSURE: 152 MMHG

## 2024-07-03 DIAGNOSIS — R07.9 CHEST PAIN, UNSPECIFIED: ICD-10-CM

## 2024-07-03 DIAGNOSIS — R06.83 SNORING: ICD-10-CM

## 2024-07-03 DIAGNOSIS — R06.02 SHORTNESS OF BREATH: ICD-10-CM

## 2024-07-03 DIAGNOSIS — J44.9 CHRONIC OBSTRUCTIVE PULMONARY DISEASE, UNSPECIFIED: ICD-10-CM

## 2024-07-03 DIAGNOSIS — D86.9 SARCOIDOSIS, UNSPECIFIED: ICD-10-CM

## 2024-07-03 PROCEDURE — ZZZZZ: CPT

## 2024-07-03 PROCEDURE — 85018 HEMOGLOBIN: CPT | Mod: QW

## 2024-07-03 PROCEDURE — 94726 PLETHYSMOGRAPHY LUNG VOLUMES: CPT

## 2024-07-03 PROCEDURE — 94010 BREATHING CAPACITY TEST: CPT

## 2024-07-03 PROCEDURE — 71046 X-RAY EXAM CHEST 2 VIEWS: CPT

## 2024-07-03 PROCEDURE — 99214 OFFICE O/P EST MOD 30 MIN: CPT | Mod: 25

## 2024-07-03 PROCEDURE — 94729 DIFFUSING CAPACITY: CPT

## 2024-07-03 RX ORDER — FLUTICASONE FUROATE AND VILANTEROL TRIFENATATE 200; 25 UG/1; UG/1
200-25 POWDER RESPIRATORY (INHALATION)
Qty: 3 | Refills: 3 | Status: ACTIVE | COMMUNITY
Start: 2024-07-03 | End: 1900-01-01

## 2024-10-02 ENCOUNTER — RX RENEWAL (OUTPATIENT)
Age: 64
End: 2024-10-02

## 2024-10-12 ENCOUNTER — APPOINTMENT (OUTPATIENT)
Dept: CT IMAGING | Facility: HOSPITAL | Age: 64
End: 2024-10-12

## 2024-10-12 ENCOUNTER — APPOINTMENT (OUTPATIENT)
Dept: MRI IMAGING | Facility: HOSPITAL | Age: 64
End: 2024-10-12

## 2024-10-12 ENCOUNTER — OUTPATIENT (OUTPATIENT)
Dept: OUTPATIENT SERVICES | Facility: HOSPITAL | Age: 64
LOS: 1 days | End: 2024-10-12
Payer: COMMERCIAL

## 2024-10-12 PROCEDURE — 72148 MRI LUMBAR SPINE W/O DYE: CPT

## 2024-10-12 PROCEDURE — 72148 MRI LUMBAR SPINE W/O DYE: CPT | Mod: 26

## 2024-10-12 PROCEDURE — 71250 CT THORAX DX C-: CPT

## 2024-10-12 PROCEDURE — 71250 CT THORAX DX C-: CPT | Mod: 26

## 2024-10-15 ENCOUNTER — APPOINTMENT (OUTPATIENT)
Dept: ORTHOPEDIC SURGERY | Facility: CLINIC | Age: 64
End: 2024-10-15
Payer: COMMERCIAL

## 2024-10-15 DIAGNOSIS — M67.951 UNSPECIFIED DISORDER OF SYNOVIUM AND TENDON, RIGHT THIGH: ICD-10-CM

## 2024-10-15 DIAGNOSIS — M47.816 SPONDYLOSIS W/OUT MYELOPATHY OR RADICULOPATHY, LUMBAR REGION: ICD-10-CM

## 2024-10-15 DIAGNOSIS — M76.891 OTHER SPECIFIED ENTHESOPATHIES OF RIGHT LOWER LIMB, EXCLUDING FOOT: ICD-10-CM

## 2024-10-15 PROCEDURE — 73503 X-RAY EXAM HIP UNI 4/> VIEWS: CPT | Mod: RT

## 2024-10-15 PROCEDURE — 99203 OFFICE O/P NEW LOW 30 MIN: CPT

## 2024-11-11 ENCOUNTER — APPOINTMENT (OUTPATIENT)
Dept: ORTHOPEDIC SURGERY | Facility: CLINIC | Age: 64
End: 2024-11-11

## 2024-12-11 ENCOUNTER — APPOINTMENT (OUTPATIENT)
Dept: ORTHOPEDIC SURGERY | Facility: CLINIC | Age: 64
End: 2024-12-11

## 2025-04-16 ENCOUNTER — APPOINTMENT (OUTPATIENT)
Dept: ORTHOPEDIC SURGERY | Facility: CLINIC | Age: 65
End: 2025-04-16
Payer: COMMERCIAL

## 2025-04-16 PROCEDURE — 99203 OFFICE O/P NEW LOW 30 MIN: CPT

## 2025-04-21 ENCOUNTER — APPOINTMENT (OUTPATIENT)
Dept: PAIN MANAGEMENT | Facility: CLINIC | Age: 65
End: 2025-04-21
Payer: COMMERCIAL

## 2025-04-21 VITALS — WEIGHT: 194 LBS | BODY MASS INDEX: 31.18 KG/M2 | HEIGHT: 66 IN

## 2025-04-21 DIAGNOSIS — M54.50 LOW BACK PAIN, UNSPECIFIED: ICD-10-CM

## 2025-04-21 DIAGNOSIS — M47.816 SPONDYLOSIS W/OUT MYELOPATHY OR RADICULOPATHY, LUMBAR REGION: ICD-10-CM

## 2025-04-21 PROCEDURE — 99204 OFFICE O/P NEW MOD 45 MIN: CPT

## 2025-05-02 ENCOUNTER — APPOINTMENT (OUTPATIENT)
Dept: PAIN MANAGEMENT | Facility: CLINIC | Age: 65
End: 2025-05-02
Payer: COMMERCIAL

## 2025-05-02 DIAGNOSIS — M54.16 RADICULOPATHY, LUMBAR REGION: ICD-10-CM

## 2025-05-02 PROCEDURE — 62323 NJX INTERLAMINAR LMBR/SAC: CPT

## 2025-05-15 ENCOUNTER — APPOINTMENT (OUTPATIENT)
Dept: PAIN MANAGEMENT | Facility: CLINIC | Age: 65
End: 2025-05-15

## 2025-06-12 ENCOUNTER — APPOINTMENT (OUTPATIENT)
Dept: PAIN MANAGEMENT | Facility: CLINIC | Age: 65
End: 2025-06-12
Payer: COMMERCIAL

## 2025-06-12 VITALS — WEIGHT: 192 LBS | HEIGHT: 66 IN | BODY MASS INDEX: 30.86 KG/M2

## 2025-06-12 PROCEDURE — 99214 OFFICE O/P EST MOD 30 MIN: CPT

## 2025-06-24 ENCOUNTER — APPOINTMENT (OUTPATIENT)
Dept: PAIN MANAGEMENT | Facility: CLINIC | Age: 65
End: 2025-06-24
Payer: COMMERCIAL

## 2025-06-24 PROCEDURE — 64483 NJX AA&/STRD TFRM EPI L/S 1: CPT | Mod: RT

## 2025-06-24 PROCEDURE — 64484 NJX AA&/STRD TFRM EPI L/S EA: CPT | Mod: RT,59

## 2025-07-21 ENCOUNTER — APPOINTMENT (OUTPATIENT)
Dept: PAIN MANAGEMENT | Facility: CLINIC | Age: 65
End: 2025-07-21
Payer: COMMERCIAL

## 2025-07-21 VITALS — BODY MASS INDEX: 30.86 KG/M2 | HEIGHT: 66 IN | WEIGHT: 192 LBS

## 2025-07-21 DIAGNOSIS — M54.16 RADICULOPATHY, LUMBAR REGION: ICD-10-CM

## 2025-07-21 DIAGNOSIS — M25.571 PAIN IN RIGHT ANKLE AND JOINTS OF RIGHT FOOT: ICD-10-CM

## 2025-07-21 DIAGNOSIS — M47.816 SPONDYLOSIS W/OUT MYELOPATHY OR RADICULOPATHY, LUMBAR REGION: ICD-10-CM

## 2025-07-21 DIAGNOSIS — G89.29 PAIN IN RIGHT ANKLE AND JOINTS OF RIGHT FOOT: ICD-10-CM

## 2025-07-21 PROCEDURE — 99214 OFFICE O/P EST MOD 30 MIN: CPT

## 2025-07-21 RX ORDER — GABAPENTIN 300 MG/1
300 CAPSULE ORAL
Qty: 30 | Refills: 1 | Status: ACTIVE | COMMUNITY
Start: 2025-07-21 | End: 1900-01-01

## 2025-09-18 ENCOUNTER — APPOINTMENT (OUTPATIENT)
Dept: PAIN MANAGEMENT | Facility: CLINIC | Age: 65
End: 2025-09-18
Payer: COMMERCIAL

## 2025-09-18 VITALS — WEIGHT: 191 LBS | HEIGHT: 66 IN | BODY MASS INDEX: 30.7 KG/M2

## 2025-09-18 DIAGNOSIS — I25.10 ATHEROSCLEROTIC HEART DISEASE OF NATIVE CORONARY ARTERY W/OUT ANGINA PECTORIS: ICD-10-CM

## 2025-09-18 DIAGNOSIS — M47.816 SPONDYLOSIS W/OUT MYELOPATHY OR RADICULOPATHY, LUMBAR REGION: ICD-10-CM

## 2025-09-18 DIAGNOSIS — M54.16 RADICULOPATHY, LUMBAR REGION: ICD-10-CM

## 2025-09-18 PROCEDURE — 99214 OFFICE O/P EST MOD 30 MIN: CPT
